# Patient Record
Sex: FEMALE | Race: BLACK OR AFRICAN AMERICAN
[De-identification: names, ages, dates, MRNs, and addresses within clinical notes are randomized per-mention and may not be internally consistent; named-entity substitution may affect disease eponyms.]

---

## 2017-11-21 ENCOUNTER — HOSPITAL ENCOUNTER (EMERGENCY)
Dept: HOSPITAL 17 - NEPD | Age: 22
Discharge: HOME | End: 2017-11-21
Payer: MEDICAID

## 2017-11-21 VITALS — BODY MASS INDEX: 29.38 KG/M2 | WEIGHT: 176.37 LBS | HEIGHT: 65 IN

## 2017-11-21 VITALS
DIASTOLIC BLOOD PRESSURE: 81 MMHG | HEART RATE: 78 BPM | OXYGEN SATURATION: 100 % | SYSTOLIC BLOOD PRESSURE: 125 MMHG | RESPIRATION RATE: 16 BRPM | TEMPERATURE: 98.9 F

## 2017-11-21 DIAGNOSIS — F91.3: ICD-10-CM

## 2017-11-21 DIAGNOSIS — Z72.0: ICD-10-CM

## 2017-11-21 DIAGNOSIS — N39.0: Primary | ICD-10-CM

## 2017-11-21 DIAGNOSIS — F31.9: ICD-10-CM

## 2017-11-21 LAB
COLOR UR: YELLOW
COMMENT (UR): (no result)
CULTURE IF INDICATED: (no result)
GLUCOSE UR STRIP-MCNC: (no result) MG/DL
HGB UR QL STRIP: (no result)
KETONES UR STRIP-MCNC: (no result) MG/DL
MUCOUS THREADS #/AREA URNS LPF: (no result) /LPF
NITRITE UR QL STRIP: (no result)
SP GR UR STRIP: 1.02 (ref 1–1.03)
SQUAMOUS #/AREA URNS HPF: 3 /HPF (ref 0–5)

## 2017-11-21 PROCEDURE — 84703 CHORIONIC GONADOTROPIN ASSAY: CPT

## 2017-11-21 PROCEDURE — 74176 CT ABD & PELVIS W/O CONTRAST: CPT

## 2017-11-21 PROCEDURE — 81001 URINALYSIS AUTO W/SCOPE: CPT

## 2017-11-21 NOTE — PD
HPI


Chief Complaint:  Abdominal Pain


Time Seen by Provider:  18:42


Travel History


International Travel<30 days:  No


Contact w/Intl Traveler<30days:  No


Traveled to known affect area:  No





History of Present Illness


HPI


c/o 2 day h/o suprapubic spasm, occasionally, 8/10 then resolves...denies any 

diarrhea/fever/back pain....





PFSH


Past Medical History


Birth Weight (Kg):  3


Cardiovascular Problems:  No


Developmental Delay:  No


Diabetes:  No


Diminished Hearing:  No


Headaches:  No


Psychiatric:  Yes (Bipolar disorder, ODD)


Immunizations Current:  Yes


Seizures:  No


Pregnant?:  Unknown


LMP:  10/2017


:  1


Para:  0


Miscarriage:  0


:  0





Past Surgical History


Other Surgery:  No





Social History


Alcohol Use:  Yes (OCC)


Tobacco Use:  Yes


Substance Use:  No





Allergies-Medications


(Allergen,Severity, Reaction):  


Coded Allergies:  


     No Known Allergies (Verified  Adverse Reaction, Unknown, 17)


Reported Meds & Prescriptions





Reported Meds & Active Scripts


Active


No Active Prescriptions or Reported Medications    








Review of Systems


Except as stated in HPI:  all other systems reviewed are Neg


General / Constitutional:  No: Fever


Eyes:  No: Visual changes


HENT:  No: Headaches


Cardiovascular:  No: Chest Pain or Discomfort


Respiratory:  No: Shortness of Breath


Gastrointestinal:  No: Abdominal Pain


Genitourinary:  Positive: Frequency, Dysuria


Musculoskeletal:  No: Pain


Skin:  No Rash


Neurologic:  No: Weakness


Psychiatric:  No: Depression


Endocrine:  No: Polydipsia


Hematologic/Lymphatic:  No: Easy Bruising





Physical Exam


Narrative


GENERAL: 


SKIN: Warm and dry.


HEAD: Atraumatic. Normocephalic. 


EYES: Pupils equal and round. No scleral icterus. No injection or drainage. 


ENT: No nasal bleeding or discharge.  Mucous membranes pink and moist.


NECK: Trachea midline. No JVD. 


CARDIOVASCULAR: Regular rate and rhythm.  


RESPIRATORY: No accessory muscle use. Clear to auscultation. Breath sounds 

equal bilaterally. 


GASTROINTESTINAL: Abdomen soft, non-tender, nondistended. 


MUSCULOSKELETAL: Extremities without clubbing, cyanosis, or edema. No obvious 

deformities. 


NEUROLOGICAL: Awake and alert. No obvious cranial nerve deficits.  Motor 

grossly within normal limits. Five out of 5 muscle strength in the arms and 

legs.  Normal speech.


PSYCHIATRIC: Appropriate mood and affect; insight and judgment normal.





Data


Data


Last Documented VS





Vital Signs








  Date Time  Temp Pulse Resp B/P (MAP) Pulse Ox O2 Delivery O2 Flow Rate FiO2


 


17 18:25 98.9 78 16 125/81 (96) 100 Room Air  








Orders





 Orders


Urinalysis - C+S If Indicated (17 18:52)


Ct Abd/Pel W/O Iv Contrast (17 18:52)


Ed Urine Pregnancytest Poc (17 18:52)





Labs





Laboratory Tests








Test


  17


19:15


 


Urine Color YELLOW 


 


Urine Turbidity CLEAR 


 


Urine pH 6.5 


 


Urine Specific Gravity 1.023 


 


Urine Protein NEG mg/dL 


 


Urine Glucose (UA) NEG mg/dL 


 


Urine Ketones NEG mg/dL 


 


Urine Occult Blood NEG 


 


Urine Nitrite NEG 


 


Urine Bilirubin NEG 


 


Urine Urobilinogen


  LESS THAN 2.0


MG/DL


 


Urine Leukocyte Esterase SMALL 


 


Urine RBC 2 /hpf 


 


Urine WBC 8 /hpf 


 


Urine Squamous Epithelial


Cells 3 /hpf 


 


 


Urine Mucus FEW /lpf 


 


Microscopic Urinalysis Comment


  CULT NOT


INDICATED











MDM


Medical Decision Making


Medical Screen Exam Complete:  Yes


Emergency Medical Condition:  Yes


Medical Record Reviewed:  Yes


Differential Diagnosis


appy v preg related v uti v colitis v divertic


Narrative Course


ct neg for colitis/appy/divertic....neg pregnancy.





Diagnosis





 Primary Impression:  


 uti


Patient Instructions:  General Instructions, Urinary Tract Infection in Women (

ED)


Scripts


Nitrofurantoin Monohydrate Macrocrystals (Macrobid) 100 Mg Capsule


100 MG PO BID for Infection, #10 CAP 0 Refills


   Prov: Kahlil Beckett MD         17


Disposition:  01 DISCHARGE HOME


Condition:  Stable











Kahlil Beckett MD 2017 18:52

## 2017-11-21 NOTE — RADRPT
EXAM DATE/TIME:  11/21/2017 19:46 

 

[facility] COMPARISON:     

No previous studies available for comparison.

 

 

INDICATIONS :     

Lower abdomen pain with dysuria. 

                  

 

ORAL CONTRAST:      

No oral contrast ingested.

                  

 

RADIATION DOSE:     

10.63 CTDIvol (mGy) 

 

 

MEDICAL HISTORY :     

None  

 

SURGICAL HISTORY :      

None. 

 

ENCOUNTER:      

Initial

 

ACUITY:      

2 days

 

PAIN SCALE:      

8/10

 

LOCATION:       

Bilateral lower quadrant 

 

TECHNIQUE:     

Volumetric scanning of the abdomen and pelvis was performed.  Using automated exposure control and ad
justment of the mA and/or kV according to patient size, radiation dose was kept as low as reasonably 
achievable to obtain optimal diagnostic quality images.  DICOM format image data is available electro
nically for review and comparison.  

 

FINDINGS:     

 

LOWER LUNGS:     

The visualized lower lungs are clear.

 

LIVER:     

Homogeneous density without lesion.  There is no dilation of the biliary tree.  No calcified gallston
es.

 

SPLEEN:     

Normal size without lesion.

 

PANCREAS:     

Within normal limits. 

 

KIDNEYS:     

Normal in size and shape.  There is no mass, stone, or hydronephrosis.

 

ADRENAL GLANDS:     

Within normal limits.

 

VASCULAR:     

There is no aortic aneurysm.

 

BOWEL/MESENTERY:     

The stomach, small bowel, and colon demonstrate no acute abnormality.  There is no free intraperitone
al air or fluid. The appendix is normal by CT criteria. 

 

ABDOMINAL WALL:     

Within normal limits.

 

RETROPERITONEUM:     

There is no lymphadenopathy.

 

BLADDER:     

No wall thickening or mass.

 

REPRODUCTIVE:     

Within normal limits.

 

INGUINAL:     

There is no lymphadenopathy or hernia.

 

MUSCULOSKELETAL:     

Within normal limits for patient age.

 

CONCLUSION:     

Normal examination.  

 

 

 

 Minor Aaron Jr., MD on November 21, 2017 at 20:06           

Board Certified Radiologist.

 This report was verified electronically.

## 2018-01-25 ENCOUNTER — HOSPITAL ENCOUNTER (EMERGENCY)
Dept: HOSPITAL 17 - NEPD | Age: 23
Discharge: HOME | End: 2018-01-25
Payer: MEDICAID

## 2018-01-25 VITALS
OXYGEN SATURATION: 100 % | SYSTOLIC BLOOD PRESSURE: 143 MMHG | DIASTOLIC BLOOD PRESSURE: 76 MMHG | HEART RATE: 68 BPM | TEMPERATURE: 98.6 F | RESPIRATION RATE: 16 BRPM

## 2018-01-25 VITALS — HEIGHT: 65 IN | BODY MASS INDEX: 27.55 KG/M2 | WEIGHT: 165.35 LBS

## 2018-01-25 DIAGNOSIS — F31.9: ICD-10-CM

## 2018-01-25 DIAGNOSIS — N39.0: ICD-10-CM

## 2018-01-25 DIAGNOSIS — A59.9: Primary | ICD-10-CM

## 2018-01-25 LAB
BACTERIA #/AREA URNS HPF: (no result) /HPF
COLOR UR: YELLOW
GLUCOSE UR STRIP-MCNC: (no result) MG/DL
HGB UR QL STRIP: (no result)
KETONES UR STRIP-MCNC: 10 MG/DL
MUCOUS THREADS #/AREA URNS LPF: (no result) /LPF
NITRITE UR QL STRIP: (no result)
SP GR UR STRIP: 1.03 (ref 1–1.03)
SQUAMOUS #/AREA URNS HPF: 4 /HPF (ref 0–5)
URINE LEUKOCYTE ESTERASE: (no result)

## 2018-01-25 PROCEDURE — 81001 URINALYSIS AUTO W/SCOPE: CPT

## 2018-01-25 PROCEDURE — 87591 N.GONORRHOEAE DNA AMP PROB: CPT

## 2018-01-25 PROCEDURE — 99284 EMERGENCY DEPT VISIT MOD MDM: CPT

## 2018-01-25 PROCEDURE — 87086 URINE CULTURE/COLONY COUNT: CPT

## 2018-01-25 PROCEDURE — 84703 CHORIONIC GONADOTROPIN ASSAY: CPT

## 2018-01-25 PROCEDURE — 87210 SMEAR WET MOUNT SALINE/INK: CPT

## 2018-01-25 PROCEDURE — 96372 THER/PROPH/DIAG INJ SC/IM: CPT

## 2018-01-25 PROCEDURE — 87491 CHLMYD TRACH DNA AMP PROBE: CPT

## 2018-01-25 NOTE — PD
HPI


.


GYN problem


Chief Complaint:  Gyn Problem/Complaint


Time Seen by Provider:  21:04


Travel History


International Travel<30 days:  No


Contact w/Intl Traveler<30days:  No


Traveled to known affect area:  No





History of Present Illness


HPI


25 yo F presents to ED with 3 weeks of constant pelvic pain and pressure and 

thick white discharge. LMP 18. She also complains of urinary frequency and 

urgency but no burning. She denies any vaginal discharge odor or color other 

than previous described. She denies any bleeding. She has had 1 partner in the 

last 6 months. Prior history of chlamydia treated early in 2017.





PFSH


Past Medical History


Birth Weight (Kg):  3


Cardiovascular Problems:  No


Developmental Delay:  No


Diabetes:  No


Diminished Hearing:  No


Headaches:  No


Psychiatric:  Yes (Bipolar disorder, ODD)


Immunizations Current:  Yes


Seizures:  No


Tetanus Vaccination:  < 5 Years


Influenza Vaccination:  No


Pregnant?:  Unknown


:  1


Para:  0


Miscarriage:  0


:  0





Past Surgical History


Other Surgery:  No





Social History


Alcohol Use:  Yes (OCC)


Tobacco Use:  Yes


Substance Use:  No





Allergies-Medications


(Allergen,Severity, Reaction):  


Coded Allergies:  


     No Known Allergies (Verified  Adverse Reaction, Unknown, 18)


Reported Meds & Prescriptions





Reported Meds & Active Scripts


Active


Macrobid (Nitrofurantoin Monohydrate Macrocrystals) 100 Mg Capsule 100 Mg PO BID








Review of Systems


Genitourinary:  Positive: Urgency, Frequency, Pelvic Pain, Dyspareunia, 

Discharge, No: Dysuria, Flank Pain, Vaginal Bleeding





Physical Exam


Narrative


GENERAL: young female sitting in bed in no acute distress


SKIN: Warm and dry.


HEAD: Normocephalic.


EYES: No scleral icterus. No injection or drainage. 


NECK: Supple, trachea midline. No JVD or lymphadenopathy.


CARDIOVASCULAR: Regular rate and rhythm without murmurs, gallops, or rubs. 


RESPIRATORY: Breath sounds equal bilaterally. No accessory muscle use.


GASTROINTESTINAL: Abdomen soft, non-tender, nondistended. 


PELVIC: Normal external genitalia. White thin discharge present in the vaginal 

vault. Cervix was visualized with white discharge extruding from the os. No 

cervical motion tenderness. Adnexa non-tender.


MUSCULOSKELETAL: No cyanosis, or edema. 


BACK: Nontender without obvious deformity. No CVA tenderness.








Data


Data


Last Documented VS





Vital Signs








  Date Time  Temp Pulse Resp B/P (MAP) Pulse Ox O2 Delivery O2 Flow Rate FiO2


 


18 20:17 98.6 68 16 143/76 (98) 100 Room Air  








Orders





 Orders


Gc And Chlamydia Pcr (18 21:13)


Wet Prep Profile (18 21:13)


Urinalysis - C+S If Indicated (18 21:13)


Ed Urine Pregnancytest Poc (18 21:13)


Urine Culture (18 21:21)


Ceftriaxone Inj (Rocephin Inj) (18 22:30)


Lidocaine 1% Inj (50 Ml) (Xylocaine 1% I (18 22:30)


Metronidazole (Flagyl) (18 22:30)


Azithromycin (Zithromax) (18 22:30)





Labs





Laboratory Tests








Test


  18


21:21


 


Urine Color YELLOW 


 


Urine Turbidity HAZY 


 


Urine pH 6.0 


 


Urine Specific Gravity 1.029 


 


Urine Protein TRACE mg/dL 


 


Urine Glucose (UA) NEG mg/dL 


 


Urine Ketones 10 mg/dL 


 


Urine Occult Blood NEG 


 


Urine Nitrite NEG 


 


Urine Bilirubin NEG 


 


Urine Urobilinogen 2.0 MG/DL 


 


Urine Leukocyte Esterase LARGE 


 


Urine RBC 23 /hpf 


 


Urine  /hpf 


 


Urine Squamous Epithelial


Cells 4 /hpf 


 


 


Urine Bacteria OCC /hpf 


 


Urine Mucus MOD /lpf 


 


Microscopic Urinalysis Comment


  CULTURE


INDICATED


 


Clue Cells (Wet Prep) NONE SEEN 


 


Vaginal Trichomonas (Wet Prep) PRESENT 


 


Vaginal Yeast (Wet Prep) NONE SEEN 











MDM


Medical Decision Making


Medical Screen Exam Complete:  Yes


Emergency Medical Condition:  Yes


Differential Diagnosis


Urinary tract infection, cervicitis, PID, pyelonephritis


Narrative Course


Patient presented with cc of vaginal discharge, pelvic pressure. A urine 

pregnancy test was negative. Pelvic exam revealed thin white discharge.





Diagnosis





 Primary Impression:  


 Trichomoniasis


 Additional Impression:  


 UTI (urinary tract infection)


 Qualified Codes:  N39.0 - Urinary tract infection, site not specified


Referrals:  


[facility] Dept.


2 days


Disposition:   DISCHARGE HOME


Condition:  Stable











Tu Brannon MD 2018 22:19